# Patient Record
Sex: FEMALE | Race: ASIAN | NOT HISPANIC OR LATINO | URBAN - METROPOLITAN AREA
[De-identification: names, ages, dates, MRNs, and addresses within clinical notes are randomized per-mention and may not be internally consistent; named-entity substitution may affect disease eponyms.]

---

## 2020-05-20 ENCOUNTER — INPATIENT (INPATIENT)
Facility: HOSPITAL | Age: 35
LOS: 1 days | Discharge: HOME | End: 2020-05-22
Attending: OBSTETRICS & GYNECOLOGY | Admitting: OBSTETRICS & GYNECOLOGY
Payer: COMMERCIAL

## 2020-05-20 RX ORDER — CITRIC ACID/SODIUM CITRATE 300-500 MG
15 SOLUTION, ORAL ORAL EVERY 6 HOURS
Refills: 0 | Status: DISCONTINUED | OUTPATIENT
Start: 2020-05-20 | End: 2020-05-21

## 2020-05-20 RX ORDER — OXYTOCIN 10 UNIT/ML
333.33 VIAL (ML) INJECTION
Qty: 20 | Refills: 0 | Status: DISCONTINUED | OUTPATIENT
Start: 2020-05-20 | End: 2020-05-21

## 2020-05-20 RX ORDER — SODIUM CHLORIDE 9 MG/ML
1000 INJECTION, SOLUTION INTRAVENOUS
Refills: 0 | Status: DISCONTINUED | OUTPATIENT
Start: 2020-05-20 | End: 2020-05-21

## 2020-05-20 RX ORDER — OXYTOCIN 10 UNIT/ML
1 VIAL (ML) INJECTION
Qty: 30 | Refills: 0 | Status: DISCONTINUED | OUTPATIENT
Start: 2020-05-20 | End: 2020-05-21

## 2020-05-21 VITALS — HEIGHT: 59 IN | WEIGHT: 139.99 LBS

## 2020-05-21 LAB
ALBUMIN SERPL ELPH-MCNC: 3.8 G/DL — SIGNIFICANT CHANGE UP (ref 3.3–5)
ALP SERPL-CCNC: 101 U/L — SIGNIFICANT CHANGE UP (ref 40–120)
ALT FLD-CCNC: 27 U/L — SIGNIFICANT CHANGE UP (ref 10–45)
ANION GAP SERPL CALC-SCNC: 12 MMOL/L — SIGNIFICANT CHANGE UP (ref 5–17)
APPEARANCE UR: CLEAR — SIGNIFICANT CHANGE UP
APTT BLD: 27.1 SEC — LOW (ref 27.5–36.3)
AST SERPL-CCNC: 30 U/L — SIGNIFICANT CHANGE UP (ref 10–40)
BASOPHILS # BLD AUTO: 0.05 K/UL — SIGNIFICANT CHANGE UP (ref 0–0.2)
BASOPHILS NFR BLD AUTO: 0.4 % — SIGNIFICANT CHANGE UP (ref 0–2)
BILIRUB SERPL-MCNC: <0.2 MG/DL — SIGNIFICANT CHANGE UP (ref 0.2–1.2)
BILIRUB UR-MCNC: NEGATIVE — SIGNIFICANT CHANGE UP
BLD GP AB SCN SERPL QL: NEGATIVE — SIGNIFICANT CHANGE UP
BUN SERPL-MCNC: 22 MG/DL — SIGNIFICANT CHANGE UP (ref 7–23)
CALCIUM SERPL-MCNC: 9.7 MG/DL — SIGNIFICANT CHANGE UP (ref 8.4–10.5)
CHLORIDE SERPL-SCNC: 104 MMOL/L — SIGNIFICANT CHANGE UP (ref 96–108)
CO2 SERPL-SCNC: 20 MMOL/L — LOW (ref 22–31)
COLOR SPEC: YELLOW — SIGNIFICANT CHANGE UP
CREAT ?TM UR-MCNC: 84 MG/DL — SIGNIFICANT CHANGE UP
CREAT SERPL-MCNC: 0.86 MG/DL — SIGNIFICANT CHANGE UP (ref 0.5–1.3)
DIFF PNL FLD: ABNORMAL
EOSINOPHIL # BLD AUTO: 0.08 K/UL — SIGNIFICANT CHANGE UP (ref 0–0.5)
EOSINOPHIL NFR BLD AUTO: 0.7 % — SIGNIFICANT CHANGE UP (ref 0–6)
FIBRINOGEN PPP-MCNC: 486 MG/DL — HIGH (ref 258–438)
GLUCOSE SERPL-MCNC: 86 MG/DL — SIGNIFICANT CHANGE UP (ref 70–99)
GLUCOSE UR QL: NEGATIVE — SIGNIFICANT CHANGE UP
HCT VFR BLD CALC: 28.7 % — LOW (ref 34.5–45)
HCT VFR BLD CALC: 40.8 % — SIGNIFICANT CHANGE UP (ref 34.5–45)
HGB BLD-MCNC: 13.6 G/DL — SIGNIFICANT CHANGE UP (ref 11.5–15.5)
HGB BLD-MCNC: 9.4 G/DL — LOW (ref 11.5–15.5)
IMM GRANULOCYTES NFR BLD AUTO: 0.6 % — SIGNIFICANT CHANGE UP (ref 0–1.5)
INR BLD: 0.83 — LOW (ref 0.88–1.16)
KETONES UR-MCNC: NEGATIVE — SIGNIFICANT CHANGE UP
LDH SERPL L TO P-CCNC: 227 U/L — SIGNIFICANT CHANGE UP (ref 50–242)
LEUKOCYTE ESTERASE UR-ACNC: NEGATIVE — SIGNIFICANT CHANGE UP
LYMPHOCYTES # BLD AUTO: 17.7 % — SIGNIFICANT CHANGE UP (ref 13–44)
LYMPHOCYTES # BLD AUTO: 2.04 K/UL — SIGNIFICANT CHANGE UP (ref 1–3.3)
MCHC RBC-ENTMCNC: 28.6 PG — SIGNIFICANT CHANGE UP (ref 27–34)
MCHC RBC-ENTMCNC: 28.8 PG — SIGNIFICANT CHANGE UP (ref 27–34)
MCHC RBC-ENTMCNC: 32.8 GM/DL — SIGNIFICANT CHANGE UP (ref 32–36)
MCHC RBC-ENTMCNC: 33.3 GM/DL — SIGNIFICANT CHANGE UP (ref 32–36)
MCV RBC AUTO: 86.3 FL — SIGNIFICANT CHANGE UP (ref 80–100)
MCV RBC AUTO: 87.2 FL — SIGNIFICANT CHANGE UP (ref 80–100)
MONOCYTES # BLD AUTO: 0.78 K/UL — SIGNIFICANT CHANGE UP (ref 0–0.9)
MONOCYTES NFR BLD AUTO: 6.8 % — SIGNIFICANT CHANGE UP (ref 2–14)
NEUTROPHILS # BLD AUTO: 8.53 K/UL — HIGH (ref 1.8–7.4)
NEUTROPHILS NFR BLD AUTO: 73.8 % — SIGNIFICANT CHANGE UP (ref 43–77)
NITRITE UR-MCNC: NEGATIVE — SIGNIFICANT CHANGE UP
NRBC # BLD: 0 /100 WBCS — SIGNIFICANT CHANGE UP (ref 0–0)
NRBC # BLD: 0 /100 WBCS — SIGNIFICANT CHANGE UP (ref 0–0)
PH UR: 6.5 — SIGNIFICANT CHANGE UP (ref 5–8)
PLATELET # BLD AUTO: 195 K/UL — SIGNIFICANT CHANGE UP (ref 150–400)
PLATELET # BLD AUTO: 274 K/UL — SIGNIFICANT CHANGE UP (ref 150–400)
POTASSIUM SERPL-MCNC: 3.9 MMOL/L — SIGNIFICANT CHANGE UP (ref 3.5–5.3)
POTASSIUM SERPL-SCNC: 3.9 MMOL/L — SIGNIFICANT CHANGE UP (ref 3.5–5.3)
PROT ?TM UR-MCNC: 8 MG/DL — SIGNIFICANT CHANGE UP (ref 0–12)
PROT SERPL-MCNC: 6.7 G/DL — SIGNIFICANT CHANGE UP (ref 6–8.3)
PROT UR-MCNC: NEGATIVE MG/DL — SIGNIFICANT CHANGE UP
PROT/CREAT UR-RTO: 0.1 RATIO — SIGNIFICANT CHANGE UP (ref 0–0.2)
PROTHROM AB SERPL-ACNC: 9.4 SEC — LOW (ref 10–12.9)
RBC # BLD: 3.29 M/UL — LOW (ref 3.8–5.2)
RBC # BLD: 4.73 M/UL — SIGNIFICANT CHANGE UP (ref 3.8–5.2)
RBC # FLD: 13.9 % — SIGNIFICANT CHANGE UP (ref 10.3–14.5)
RBC # FLD: 14.1 % — SIGNIFICANT CHANGE UP (ref 10.3–14.5)
RH IG SCN BLD-IMP: POSITIVE — SIGNIFICANT CHANGE UP
RH IG SCN BLD-IMP: POSITIVE — SIGNIFICANT CHANGE UP
SODIUM SERPL-SCNC: 136 MMOL/L — SIGNIFICANT CHANGE UP (ref 135–145)
SP GR SPEC: 1.02 — SIGNIFICANT CHANGE UP (ref 1–1.03)
T PALLIDUM AB TITR SER: NEGATIVE — SIGNIFICANT CHANGE UP
URATE SERPL-MCNC: 5.8 MG/DL — SIGNIFICANT CHANGE UP (ref 2.5–7)
UROBILINOGEN FLD QL: 0.2 E.U./DL — SIGNIFICANT CHANGE UP
WBC # BLD: 11.55 K/UL — HIGH (ref 3.8–10.5)
WBC # BLD: 17.48 K/UL — HIGH (ref 3.8–10.5)
WBC # FLD AUTO: 11.55 K/UL — HIGH (ref 3.8–10.5)
WBC # FLD AUTO: 17.48 K/UL — HIGH (ref 3.8–10.5)

## 2020-05-21 PROCEDURE — 88307 TISSUE EXAM BY PATHOLOGIST: CPT | Mod: 26

## 2020-05-21 RX ORDER — TETANUS TOXOID, REDUCED DIPHTHERIA TOXOID AND ACELLULAR PERTUSSIS VACCINE, ADSORBED 5; 2.5; 8; 8; 2.5 [IU]/.5ML; [IU]/.5ML; UG/.5ML; UG/.5ML; UG/.5ML
0.5 SUSPENSION INTRAMUSCULAR ONCE
Refills: 0 | Status: DISCONTINUED | OUTPATIENT
Start: 2020-05-21 | End: 2020-05-22

## 2020-05-21 RX ORDER — DIBUCAINE 1 %
1 OINTMENT (GRAM) RECTAL EVERY 6 HOURS
Refills: 0 | Status: DISCONTINUED | OUTPATIENT
Start: 2020-05-21 | End: 2020-05-22

## 2020-05-21 RX ORDER — NIFEDIPINE 30 MG
30 TABLET, EXTENDED RELEASE 24 HR ORAL EVERY 24 HOURS
Refills: 0 | Status: DISCONTINUED | OUTPATIENT
Start: 2020-05-22 | End: 2020-05-22

## 2020-05-21 RX ORDER — LABETALOL HCL 100 MG
100 TABLET ORAL EVERY 12 HOURS
Refills: 0 | Status: DISCONTINUED | OUTPATIENT
Start: 2020-05-21 | End: 2020-05-22

## 2020-05-21 RX ORDER — HYDROCORTISONE 1 %
1 OINTMENT (GRAM) TOPICAL EVERY 6 HOURS
Refills: 0 | Status: DISCONTINUED | OUTPATIENT
Start: 2020-05-21 | End: 2020-05-22

## 2020-05-21 RX ORDER — OXYCODONE HYDROCHLORIDE 5 MG/1
5 TABLET ORAL ONCE
Refills: 0 | Status: DISCONTINUED | OUTPATIENT
Start: 2020-05-21 | End: 2020-05-22

## 2020-05-21 RX ORDER — OXYCODONE HYDROCHLORIDE 5 MG/1
5 TABLET ORAL
Refills: 0 | Status: DISCONTINUED | OUTPATIENT
Start: 2020-05-21 | End: 2020-05-22

## 2020-05-21 RX ORDER — SODIUM CHLORIDE 9 MG/ML
3 INJECTION INTRAMUSCULAR; INTRAVENOUS; SUBCUTANEOUS EVERY 8 HOURS
Refills: 0 | Status: DISCONTINUED | OUTPATIENT
Start: 2020-05-21 | End: 2020-05-22

## 2020-05-21 RX ORDER — OXYTOCIN 10 UNIT/ML
333.33 VIAL (ML) INJECTION
Qty: 20 | Refills: 0 | Status: DISCONTINUED | OUTPATIENT
Start: 2020-05-21 | End: 2020-05-22

## 2020-05-21 RX ORDER — ACETAMINOPHEN 500 MG
975 TABLET ORAL
Refills: 0 | Status: DISCONTINUED | OUTPATIENT
Start: 2020-05-21 | End: 2020-05-22

## 2020-05-21 RX ORDER — LABETALOL HCL 100 MG
200 TABLET ORAL EVERY 12 HOURS
Refills: 0 | Status: DISCONTINUED | OUTPATIENT
Start: 2020-05-21 | End: 2020-05-21

## 2020-05-21 RX ORDER — AER TRAVELER 0.5 G/1
1 SOLUTION RECTAL; TOPICAL EVERY 4 HOURS
Refills: 0 | Status: DISCONTINUED | OUTPATIENT
Start: 2020-05-21 | End: 2020-05-22

## 2020-05-21 RX ORDER — IBUPROFEN 200 MG
600 TABLET ORAL EVERY 6 HOURS
Refills: 0 | Status: COMPLETED | OUTPATIENT
Start: 2020-05-21 | End: 2021-04-19

## 2020-05-21 RX ORDER — IBUPROFEN 200 MG
600 TABLET ORAL EVERY 6 HOURS
Refills: 0 | Status: DISCONTINUED | OUTPATIENT
Start: 2020-05-21 | End: 2020-05-22

## 2020-05-21 RX ORDER — MAGNESIUM HYDROXIDE 400 MG/1
30 TABLET, CHEWABLE ORAL
Refills: 0 | Status: DISCONTINUED | OUTPATIENT
Start: 2020-05-21 | End: 2020-05-22

## 2020-05-21 RX ORDER — BENZOCAINE 10 %
1 GEL (GRAM) MUCOUS MEMBRANE EVERY 6 HOURS
Refills: 0 | Status: DISCONTINUED | OUTPATIENT
Start: 2020-05-21 | End: 2020-05-22

## 2020-05-21 RX ORDER — LANOLIN
1 OINTMENT (GRAM) TOPICAL EVERY 6 HOURS
Refills: 0 | Status: DISCONTINUED | OUTPATIENT
Start: 2020-05-21 | End: 2020-05-22

## 2020-05-21 RX ORDER — SIMETHICONE 80 MG/1
80 TABLET, CHEWABLE ORAL EVERY 4 HOURS
Refills: 0 | Status: DISCONTINUED | OUTPATIENT
Start: 2020-05-21 | End: 2020-05-22

## 2020-05-21 RX ORDER — PRAMOXINE HYDROCHLORIDE 150 MG/15G
1 AEROSOL, FOAM RECTAL EVERY 4 HOURS
Refills: 0 | Status: DISCONTINUED | OUTPATIENT
Start: 2020-05-21 | End: 2020-05-22

## 2020-05-21 RX ORDER — AMPICILLIN TRIHYDRATE 250 MG
1 CAPSULE ORAL EVERY 4 HOURS
Refills: 0 | Status: DISCONTINUED | OUTPATIENT
Start: 2020-05-21 | End: 2020-05-21

## 2020-05-21 RX ORDER — FENTANYL/BUPIVACAINE/NS/PF 2MCG/ML-.1
250 PLASTIC BAG, INJECTION (ML) INJECTION
Refills: 0 | Status: DISCONTINUED | OUTPATIENT
Start: 2020-05-21 | End: 2020-05-21

## 2020-05-21 RX ORDER — AMPICILLIN TRIHYDRATE 250 MG
2 CAPSULE ORAL ONCE
Refills: 0 | Status: COMPLETED | OUTPATIENT
Start: 2020-05-21 | End: 2020-05-21

## 2020-05-21 RX ORDER — LABETALOL HCL 100 MG
100 TABLET ORAL EVERY 12 HOURS
Refills: 0 | Status: DISCONTINUED | OUTPATIENT
Start: 2020-05-21 | End: 2020-05-21

## 2020-05-21 RX ORDER — KETOROLAC TROMETHAMINE 30 MG/ML
30 SYRINGE (ML) INJECTION ONCE
Refills: 0 | Status: DISCONTINUED | OUTPATIENT
Start: 2020-05-21 | End: 2020-05-21

## 2020-05-21 RX ORDER — CARBOPROST TROMETHAMINE 250 UG/ML
250 INJECTION, SOLUTION INTRAMUSCULAR ONCE
Refills: 0 | Status: COMPLETED | OUTPATIENT
Start: 2020-05-21 | End: 2020-05-21

## 2020-05-21 RX ORDER — NIFEDIPINE 30 MG
60 TABLET, EXTENDED RELEASE 24 HR ORAL DAILY
Refills: 0 | Status: DISCONTINUED | OUTPATIENT
Start: 2020-05-21 | End: 2020-05-21

## 2020-05-21 RX ORDER — DIPHENHYDRAMINE HCL 50 MG
25 CAPSULE ORAL EVERY 6 HOURS
Refills: 0 | Status: DISCONTINUED | OUTPATIENT
Start: 2020-05-21 | End: 2020-05-22

## 2020-05-21 RX ADMIN — SODIUM CHLORIDE 125 MILLILITER(S): 9 INJECTION, SOLUTION INTRAVENOUS at 06:33

## 2020-05-21 RX ADMIN — Medication 1000 MILLIUNIT(S)/MIN: at 20:00

## 2020-05-21 RX ADMIN — Medication 60 MILLIGRAM(S): at 06:31

## 2020-05-21 RX ADMIN — SODIUM CHLORIDE 125 MILLILITER(S): 9 INJECTION, SOLUTION INTRAVENOUS at 09:10

## 2020-05-21 RX ADMIN — Medication 30 MILLIGRAM(S): at 17:52

## 2020-05-21 RX ADMIN — Medication 108 GRAM(S): at 09:09

## 2020-05-21 RX ADMIN — CARBOPROST TROMETHAMINE 250 MICROGRAM(S): 250 INJECTION, SOLUTION INTRAMUSCULAR at 21:35

## 2020-05-21 RX ADMIN — SODIUM CHLORIDE 125 MILLILITER(S): 9 INJECTION, SOLUTION INTRAVENOUS at 02:04

## 2020-05-21 RX ADMIN — Medication 1 MILLIUNIT(S)/MIN: at 02:05

## 2020-05-21 RX ADMIN — Medication 100 MILLIGRAM(S): at 18:24

## 2020-05-21 RX ADMIN — Medication 100 MILLIGRAM(S): at 06:31

## 2020-05-21 RX ADMIN — SODIUM CHLORIDE 3 MILLILITER(S): 9 INJECTION INTRAMUSCULAR; INTRAVENOUS; SUBCUTANEOUS at 22:19

## 2020-05-21 RX ADMIN — Medication 108 GRAM(S): at 13:33

## 2020-05-21 RX ADMIN — Medication 975 MILLIGRAM(S): at 21:29

## 2020-05-21 RX ADMIN — Medication 108 GRAM(S): at 05:30

## 2020-05-21 RX ADMIN — Medication 216 GRAM(S): at 01:15

## 2020-05-21 NOTE — CHART NOTE - NSCHARTNOTEFT_GEN_A_CORE
Pt evaluated at bedside for nurse's concern of bleeding. Nurse reported trickling of blood more than used to seeing. She states uterus is firm and urine output adequate, although blood-tinged. Upon evaluation, pt well-appearing, comfortable, and in NAD. Pt reports feeling well and was in the middle of eating dinner. Therefore, I will return to perform a physical examination shortly. Of note, vital signs stable (/80, HR 78, RR 18, 98% on RA).

## 2020-05-21 NOTE — CHART NOTE - NSCHARTNOTEFT_GEN_A_CORE
Resident called to bedside by PA for active vaginal bleeding. Patient lying comfortable in bed, VSS. She is feeling overall well. Denies light headedness, dizziness, SOB, palpitations or difficulty ambulating. She has been ambulating on her own and  voiding spontaneously.  Fundus firm with passage of clots on fundal massage.  No vaginal lacerations noted. Lower uterine segment atony noted on bimanual exam with passage of clots. Patient on second bag of pitocin running, Chief resident called to bedside.  Hemabate 250mcg x1 given, and 1000mg cytotec IA. Bimanual exam x3 with continued expression of blood clots for a total EBL of 400cc. Starting hemaglobin of 13.6. Stat CBC ordered. Lower uterine atony resolved after hemabate. Hemstasis excellent. Will continue to monitor bleeding and f/u CBC. VSS throughout.     Vital Signs Last 24 Hrs  T(C): 36.6 (21 May 2020 19:00), Max: 37.1 (21 May 2020 17:00)  T(F): 97.9 (21 May 2020 19:00), Max: 98.8 (21 May 2020 17:00)  HR: 77 (21 May 2020 19:00) (77 - 100)  BP: 123/80 (21 May 2020 19:00) (123/80 - 146/90)  BP(mean): --  RR: 18 (21 May 2020 19:00) (17 - 20)  SpO2: 98% (21 May 2020 19:00) (98% - 99%)

## 2020-05-21 NOTE — PROVIDER CONTACT NOTE (OTHER) - ASSESSMENT
Fundus firm. vital stable. Trickle of bleeding on fundal assessment. Patient assisted x2 to the bathroom to void. Voided 100 and bled about 100ml.

## 2020-05-22 VITALS
DIASTOLIC BLOOD PRESSURE: 85 MMHG | RESPIRATION RATE: 14 BRPM | HEART RATE: 89 BPM | SYSTOLIC BLOOD PRESSURE: 124 MMHG | OXYGEN SATURATION: 98 % | TEMPERATURE: 98 F

## 2020-05-22 PROCEDURE — 84156 ASSAY OF PROTEIN URINE: CPT

## 2020-05-22 PROCEDURE — 88307 TISSUE EXAM BY PATHOLOGIST: CPT

## 2020-05-22 PROCEDURE — 83615 LACTATE (LD) (LDH) ENZYME: CPT

## 2020-05-22 PROCEDURE — 85384 FIBRINOGEN ACTIVITY: CPT

## 2020-05-22 PROCEDURE — 85610 PROTHROMBIN TIME: CPT

## 2020-05-22 PROCEDURE — 81001 URINALYSIS AUTO W/SCOPE: CPT

## 2020-05-22 PROCEDURE — 82570 ASSAY OF URINE CREATININE: CPT

## 2020-05-22 PROCEDURE — 85730 THROMBOPLASTIN TIME PARTIAL: CPT

## 2020-05-22 PROCEDURE — 86901 BLOOD TYPING SEROLOGIC RH(D): CPT

## 2020-05-22 PROCEDURE — 80053 COMPREHEN METABOLIC PANEL: CPT

## 2020-05-22 PROCEDURE — 85027 COMPLETE CBC AUTOMATED: CPT

## 2020-05-22 PROCEDURE — 84550 ASSAY OF BLOOD/URIC ACID: CPT

## 2020-05-22 PROCEDURE — 86850 RBC ANTIBODY SCREEN: CPT

## 2020-05-22 PROCEDURE — 86780 TREPONEMA PALLIDUM: CPT

## 2020-05-22 PROCEDURE — 85025 COMPLETE CBC W/AUTO DIFF WBC: CPT

## 2020-05-22 PROCEDURE — 36415 COLL VENOUS BLD VENIPUNCTURE: CPT

## 2020-05-22 RX ORDER — NIFEDIPINE 30 MG
1 TABLET, EXTENDED RELEASE 24 HR ORAL
Qty: 0 | Refills: 0 | DISCHARGE

## 2020-05-22 RX ORDER — ASPIRIN/CALCIUM CARB/MAGNESIUM 324 MG
1 TABLET ORAL
Qty: 0 | Refills: 0 | DISCHARGE

## 2020-05-22 RX ORDER — LABETALOL HCL 100 MG
1 TABLET ORAL
Qty: 0 | Refills: 0 | DISCHARGE

## 2020-05-22 RX ADMIN — Medication 600 MILLIGRAM(S): at 12:16

## 2020-05-22 RX ADMIN — Medication 100 MILLIGRAM(S): at 06:49

## 2020-05-22 RX ADMIN — SODIUM CHLORIDE 3 MILLILITER(S): 9 INJECTION INTRAMUSCULAR; INTRAVENOUS; SUBCUTANEOUS at 06:49

## 2020-05-22 RX ADMIN — Medication 975 MILLIGRAM(S): at 03:04

## 2020-05-22 RX ADMIN — SODIUM CHLORIDE 3 MILLILITER(S): 9 INJECTION INTRAMUSCULAR; INTRAVENOUS; SUBCUTANEOUS at 14:22

## 2020-05-22 RX ADMIN — Medication 30 MILLIGRAM(S): at 09:54

## 2020-05-22 RX ADMIN — Medication 600 MILLIGRAM(S): at 06:49

## 2020-05-22 RX ADMIN — Medication 975 MILLIGRAM(S): at 14:45

## 2020-05-22 RX ADMIN — Medication 600 MILLIGRAM(S): at 17:44

## 2020-05-22 NOTE — DISCHARGE NOTE OB - HOSPITAL COURSE
Patient had an uncomplicated vaginal delivery. Blood pressure controlled with labetalol 100mg twice daily and nifedipine 60mg daily.

## 2020-05-22 NOTE — DISCHARGE NOTE OB - PATIENT PORTAL LINK FT
You can access the FollowMyHealth Patient Portal offered by [facility] by registering at the following website: [email or web address]. By joining [employer]’s FollowMyHealth portal, you will also be able to view your health information using other applications (apps) compatible with our system.

## 2020-05-22 NOTE — DISCHARGE NOTE OB - PLAN OF CARE
healthy postpartum state Take Motrin 600mg every 6 hours and/or tylenol 650mg every 6 hours as needed for pain. Call your OBGYN to schedule a follow up appointment in 4-6weeks. Call your OBGYN if you experiences severe abdominal pain not improved by oral pain medications, heavy bright red vaginal bleeding saturating more than 1 pad per hour, or fever greater than 100.4F. normal range BP Follow up with your OBGYN in one week for a BP check. Schedule a follow up  appointment for 2 weeks. Check bp 3x a day. If blood pressure is greater  >160/110, you develop a headache not relieved by tylenol, visual disturbances, or right upper abdominal pain, call your doctor or go to your nearest emergency room.  Continue with labetalol 100mg twice daily and nifedipine 60mg daily.

## 2020-05-22 NOTE — PROGRESS NOTE ADULT - ATTENDING COMMENTS
Spoke with patient, patient made aware of recent negative urine culture results  Patient to follow up as already scheduled  Patient knows to call office if symptoms return  Patient seen and examined, agree with assessment and plan.  PPD1 s/p  c/b PPH after transfer to PP unit, now doing well.  Also with chronic HTN, BPs appropriate, will monitor.    Dr. Mcconnell, Attending

## 2020-05-22 NOTE — PROGRESS NOTE ADULT - SUBJECTIVE AND OBJECTIVE BOX
Patient evaluated at bedside this morning, resting comfortable in bed. Overnight patient received hemabate and cytotec with bimanual massage for heavy bleeding due to lower uterine segement atony. Bleeding has resolved. She is ambulating to the bathroom and voiding spontaneously.   She reports pain is well controlled with tylenol and motrin  She denies headache, dizziness, chest pain, palpitations, shortness of breath, nausea, vomiting, heavy vaginal bleeding or perineal discomfort. Reports decrease in amount of vaginal bleeding and denies clots.  She is planning on breastfeeding.   Tolerating food well, without nausea/vomit.  Passing flatus.     Physical Exam:  T(C): 36.9 (05-22-20 @ 02:00), Max: 36.9 (05-22-20 @ 02:00)  HR: 69 (05-22-20 @ 02:00) (69 - 93)  BP: 115/74 (05-22-20 @ 02:00) (115/74 - 142/79)  RR: 16 (05-22-20 @ 02:00) (16 - 18)  SpO2: 97% (05-22-20 @ 02:00) (97% - 99%)    GA: NAD, A&O x 3  CV: RRR, no murmurs, rubs, or gallops  Pulm: clear breath sounds throughout, no rales, rhonchi, wheezes  Abd: + BS, soft, nontender, nondistended, no rebound or guarding, uterus firm at midline and 1 fb below umbilicus  Perineum: normal lochia, intact, healing well, no hematoma  Extremities: no swelling or calf tenderness                          9.4    17.48 )-----------( 195      ( 21 May 2020 21:50 )             28.7     05-21    136  |  104  |  22  ----------------------------<  86  3.9   |  20<L>  |  0.86    Ca    9.7      21 May 2020 00:33    TPro  6.7  /  Alb  3.8  /  TBili  <0.2  /  DBili  x   /  AST  30  /  ALT  27  /  AlkPhos  101  05-21    acetaminophen   Tablet .. 975 milliGRAM(s) Oral <User Schedule>  benzocaine 20%/menthol 0.5% Spray 1 Spray(s) Topical every 6 hours PRN  dibucaine 1% Ointment 1 Application(s) Topical every 6 hours PRN  diphenhydrAMINE 25 milliGRAM(s) Oral every 6 hours PRN  diphtheria/tetanus/pertussis (acellular) Vaccine (ADAcel) 0.5 milliLiter(s) IntraMuscular once  hydrocortisone 1% Cream 1 Application(s) Topical every 6 hours PRN  ibuprofen  Tablet. 600 milliGRAM(s) Oral every 6 hours  labetalol 100 milliGRAM(s) Oral every 12 hours  lanolin Ointment 1 Application(s) Topical every 6 hours PRN  magnesium hydroxide Suspension 30 milliLiter(s) Oral two times a day PRN  NIFEdipine XL 30 milliGRAM(s) Oral every 24 hours  oxyCODONE    IR 5 milliGRAM(s) Oral every 3 hours PRN  oxyCODONE    IR 5 milliGRAM(s) Oral once PRN  oxytocin Infusion 333.333 milliUNIT(s)/Min IV Continuous <Continuous>  pramoxine 1%/zinc 5% Cream 1 Application(s) Topical every 4 hours PRN  prenatal multivitamin 1 Tablet(s) Oral daily  simethicone 80 milliGRAM(s) Chew every 4 hours PRN  sodium chloride 0.9% lock flush 3 milliLiter(s) IV Push every 8 hours  witch hazel Pads 1 Application(s) Topical every 4 hours PRN Patient evaluated at bedside this morning, resting comfortable in bed. Overnight patient received hemabate and cytotec with bimanual massage for heavy bleeding (400cc) due to lower uterine segement atony. Bleeding has resolved. She is ambulating to the bathroom and voiding spontaneously.   She reports pain is well controlled with tylenol and motrin  She denies headache, dizziness, chest pain, palpitations, shortness of breath, nausea, vomiting, heavy vaginal bleeding or perineal discomfort. Reports decrease in amount of vaginal bleeding and denies clots.  She is planning on breastfeeding.   Tolerating food well, without nausea/vomit.  Passing flatus.     Physical Exam:  T(C): 36.9 (05-22-20 @ 02:00), Max: 36.9 (05-22-20 @ 02:00)  HR: 69 (05-22-20 @ 02:00) (69 - 93)  BP: 115/74 (05-22-20 @ 02:00) (115/74 - 142/79)  RR: 16 (05-22-20 @ 02:00) (16 - 18)  SpO2: 97% (05-22-20 @ 02:00) (97% - 99%)    GA: NAD, A&O x 3  CV: RRR, no murmurs, rubs, or gallops  Pulm: clear breath sounds throughout, no rales, rhonchi, wheezes  Abd: + BS, soft, nontender, nondistended, no rebound or guarding, uterus firm at midline and 1 fb below umbilicus  Perineum: normal lochia, intact, healing well, no hematoma  Extremities: no swelling or calf tenderness                          9.4    17.48 )-----------( 195      ( 21 May 2020 21:50 )             28.7     05-21    136  |  104  |  22  ----------------------------<  86  3.9   |  20<L>  |  0.86    Ca    9.7      21 May 2020 00:33    TPro  6.7  /  Alb  3.8  /  TBili  <0.2  /  DBili  x   /  AST  30  /  ALT  27  /  AlkPhos  101  05-21    acetaminophen   Tablet .. 975 milliGRAM(s) Oral <User Schedule>  benzocaine 20%/menthol 0.5% Spray 1 Spray(s) Topical every 6 hours PRN  dibucaine 1% Ointment 1 Application(s) Topical every 6 hours PRN  diphenhydrAMINE 25 milliGRAM(s) Oral every 6 hours PRN  diphtheria/tetanus/pertussis (acellular) Vaccine (ADAcel) 0.5 milliLiter(s) IntraMuscular once  hydrocortisone 1% Cream 1 Application(s) Topical every 6 hours PRN  ibuprofen  Tablet. 600 milliGRAM(s) Oral every 6 hours  labetalol 100 milliGRAM(s) Oral every 12 hours  lanolin Ointment 1 Application(s) Topical every 6 hours PRN  magnesium hydroxide Suspension 30 milliLiter(s) Oral two times a day PRN  NIFEdipine XL 30 milliGRAM(s) Oral every 24 hours  oxyCODONE    IR 5 milliGRAM(s) Oral every 3 hours PRN  oxyCODONE    IR 5 milliGRAM(s) Oral once PRN  oxytocin Infusion 333.333 milliUNIT(s)/Min IV Continuous <Continuous>  pramoxine 1%/zinc 5% Cream 1 Application(s) Topical every 4 hours PRN  prenatal multivitamin 1 Tablet(s) Oral daily  simethicone 80 milliGRAM(s) Chew every 4 hours PRN  sodium chloride 0.9% lock flush 3 milliLiter(s) IV Push every 8 hours  witch hazel Pads 1 Application(s) Topical every 4 hours PRN

## 2020-05-22 NOTE — PROGRESS NOTE ADULT - ASSESSMENT
A/P 35y s/p , PPD # 1 , stable, meeting postpartum milestones   - PPH: s/p hemabate x1, cytotec 1000mg MO, lochia now within normal limits. VSS.   - Pain: well controlled on tylenol and motrin  - GI: Tolerating regular diet, colace PRN  - : urinating without difficulty/pain  -DVT prophylaxis: ambulating frequently  -Dispo: PPD 2, unless otherwise specified

## 2020-05-22 NOTE — DISCHARGE NOTE OB - CARE PLAN
Principal Discharge DX:	Postpartum state  Goal:	healthy postpartum state  Assessment and plan of treatment:	Take Motrin 600mg every 6 hours and/or tylenol 650mg every 6 hours as needed for pain. Call your OBGYN to schedule a follow up appointment in 4-6weeks. Call your OBGYN if you experiences severe abdominal pain not improved by oral pain medications, heavy bright red vaginal bleeding saturating more than 1 pad per hour, or fever greater than 100.4F.  Secondary Diagnosis:	Chronic hypertension  Goal:	normal range BP  Assessment and plan of treatment:	Follow up with your OBGYN in one week for a BP check. Schedule a follow up  appointment for 2 weeks. Check bp 3x a day. If blood pressure is greater  >160/110, you develop a headache not relieved by tylenol, visual disturbances, or right upper abdominal pain, call your doctor or go to your nearest emergency room.  Continue with labetalol 100mg twice daily and nifedipine 60mg daily.

## 2020-05-22 NOTE — LACTATION INITIAL EVALUATION - NS LACT CON REASON FOR REQ
primaparous mom/Pt.is a P1 at 38.3 wks. gestation via vaginal delivery.  Pt. reports chronic hypertension and uses labetalol, and nifedipine.  Baby is  25 hrs old has had 1 void,4 stools, weight loss 1.35%.  Baby is asleep.  Pt. preparing for discharge.  Pt. reports baby has been able to latch and is able to take nipple and areola.  Upon observation not damage to nipples noted, colostrum easily noted upon hand expression.  Reviewed with pt. to observe for early feeding cues, encourage skin to skin when breastfeeding, proper alignment of baby's nose to pt.'s nipple,  breastfeed 8-12 x/24 hrs., monitor voids and stools.  Encouraged pt. to exclusively breastfeed 4-6 wks. before introducing pumping.  If pumping now pt. may need to supplement baby with formula if not collecting sufficient amount of colostrum to allow FOB to feed baby so pt. can sleep. Pt. is a P1 at 38.3 wks. gestation via vaginal delivery.  Pt. reports chronic hypertension and uses labetalol, and nifedipine.  Baby is  25 hrs old has had 1 void,4 stools, weight loss 1.35%.  Baby is asleep.  Pt. preparing for discharge.  Pt. reports baby has been able to latch and is able to take nipple and areola.  Upon observation nipples are not bruised or damaged, colostrum easily noted upon hand expression.  Reviewed with pt. to observe for early feeding cues, encourage skin to skin when breastfeeding, proper alignment of baby's nose to pt.'s nipple,  breastfeed 8-12 x/24 hrs., monitor voids and stools.  Encouraged pt. to exclusively breastfeed 4-6 wks. before introducing pumping.  If pumping now pt. may need to supplement baby with formula if not collecting sufficient amount of colostrum to allow FOB to feed baby so pt. can sleep./primaparous mom

## 2020-05-22 NOTE — DISCHARGE NOTE OB - CARE PROVIDER_API CALL
Isela Hood  Obstetrics and Gynecology  [address]  [place] [address]  Phone: [phone]  Fax: [phone]  Follow Up Time:

## 2020-05-22 NOTE — LACTATION INITIAL EVALUATION - LACTATION INTERVENTIONS
[FreeTextEntry1] : cc f/u turbt \par cath removed last visit and voided well\par but had episode of retention goins replaced\par \par path high grade invading lamina propria \par no muscle in specimen  reviewed with pt. to observe for early feeding cues, proper alignment of baby 's nose to pt.'s nipple to encourage a deep latch, breastfeed 8-12 x/24 hrs., monitor voids and stools.

## 2020-05-26 LAB — SURGICAL PATHOLOGY STUDY: SIGNIFICANT CHANGE UP

## 2020-05-27 DIAGNOSIS — Z3A.38 38 WEEKS GESTATION OF PREGNANCY: ICD-10-CM

## 2020-05-27 DIAGNOSIS — Z34.03 ENCOUNTER FOR SUPERVISION OF NORMAL FIRST PREGNANCY, THIRD TRIMESTER: ICD-10-CM

## 2022-05-06 ENCOUNTER — INPATIENT (INPATIENT)
Facility: HOSPITAL | Age: 37
LOS: 0 days | Discharge: HOME | End: 2022-05-07
Attending: OBSTETRICS & GYNECOLOGY | Admitting: OBSTETRICS & GYNECOLOGY
Payer: COMMERCIAL

## 2022-05-06 VITALS — WEIGHT: 149.91 LBS | HEIGHT: 59 IN

## 2022-05-06 LAB
ALBUMIN SERPL ELPH-MCNC: 3.4 G/DL — SIGNIFICANT CHANGE UP (ref 3.3–5)
ALP SERPL-CCNC: 94 U/L — SIGNIFICANT CHANGE UP (ref 40–120)
ALT FLD-CCNC: 21 U/L — SIGNIFICANT CHANGE UP (ref 10–45)
ANION GAP SERPL CALC-SCNC: 9 MMOL/L — SIGNIFICANT CHANGE UP (ref 5–17)
APTT BLD: 27.2 SEC — LOW (ref 27.5–35.5)
AST SERPL-CCNC: 25 U/L — SIGNIFICANT CHANGE UP (ref 10–40)
BASOPHILS # BLD AUTO: 0.05 K/UL — SIGNIFICANT CHANGE UP (ref 0–0.2)
BASOPHILS NFR BLD AUTO: 0.5 % — SIGNIFICANT CHANGE UP (ref 0–2)
BILIRUB SERPL-MCNC: 0.2 MG/DL — SIGNIFICANT CHANGE UP (ref 0.2–1.2)
BLD GP AB SCN SERPL QL: NEGATIVE — SIGNIFICANT CHANGE UP
BUN SERPL-MCNC: 15 MG/DL — SIGNIFICANT CHANGE UP (ref 7–23)
CALCIUM SERPL-MCNC: 8.9 MG/DL — SIGNIFICANT CHANGE UP (ref 8.4–10.5)
CHLORIDE SERPL-SCNC: 105 MMOL/L — SIGNIFICANT CHANGE UP (ref 96–108)
CO2 SERPL-SCNC: 20 MMOL/L — LOW (ref 22–31)
COVID-19 SPIKE DOMAIN AB INTERP: POSITIVE
COVID-19 SPIKE DOMAIN ANTIBODY RESULT: >250 U/ML — HIGH
CREAT ?TM UR-MCNC: 128 MG/DL — SIGNIFICANT CHANGE UP
CREAT SERPL-MCNC: 0.61 MG/DL — SIGNIFICANT CHANGE UP (ref 0.5–1.3)
EGFR: 117 ML/MIN/1.73M2 — SIGNIFICANT CHANGE UP
EOSINOPHIL # BLD AUTO: 0.05 K/UL — SIGNIFICANT CHANGE UP (ref 0–0.5)
EOSINOPHIL NFR BLD AUTO: 0.5 % — SIGNIFICANT CHANGE UP (ref 0–6)
FIBRINOGEN PPP-MCNC: 459 MG/DL — HIGH (ref 258–438)
GLUCOSE SERPL-MCNC: 94 MG/DL — SIGNIFICANT CHANGE UP (ref 70–99)
HCT VFR BLD CALC: 36.1 % — SIGNIFICANT CHANGE UP (ref 34.5–45)
HGB BLD-MCNC: 11.9 G/DL — SIGNIFICANT CHANGE UP (ref 11.5–15.5)
IMM GRANULOCYTES NFR BLD AUTO: 0.5 % — SIGNIFICANT CHANGE UP (ref 0–1.5)
INR BLD: 0.84 — LOW (ref 0.88–1.16)
LDH SERPL L TO P-CCNC: 187 U/L — SIGNIFICANT CHANGE UP (ref 50–242)
LYMPHOCYTES # BLD AUTO: 1.53 K/UL — SIGNIFICANT CHANGE UP (ref 1–3.3)
LYMPHOCYTES # BLD AUTO: 15.2 % — SIGNIFICANT CHANGE UP (ref 13–44)
MCHC RBC-ENTMCNC: 28.2 PG — SIGNIFICANT CHANGE UP (ref 27–34)
MCHC RBC-ENTMCNC: 33 GM/DL — SIGNIFICANT CHANGE UP (ref 32–36)
MCV RBC AUTO: 85.5 FL — SIGNIFICANT CHANGE UP (ref 80–100)
MONOCYTES # BLD AUTO: 0.6 K/UL — SIGNIFICANT CHANGE UP (ref 0–0.9)
MONOCYTES NFR BLD AUTO: 6 % — SIGNIFICANT CHANGE UP (ref 2–14)
NEUTROPHILS # BLD AUTO: 7.76 K/UL — HIGH (ref 1.8–7.4)
NEUTROPHILS NFR BLD AUTO: 77.3 % — HIGH (ref 43–77)
NRBC # BLD: 0 /100 WBCS — SIGNIFICANT CHANGE UP (ref 0–0)
PLATELET # BLD AUTO: 270 K/UL — SIGNIFICANT CHANGE UP (ref 150–400)
POTASSIUM SERPL-MCNC: 3.7 MMOL/L — SIGNIFICANT CHANGE UP (ref 3.5–5.3)
POTASSIUM SERPL-SCNC: 3.7 MMOL/L — SIGNIFICANT CHANGE UP (ref 3.5–5.3)
PROT ?TM UR-MCNC: 12 MG/DL — SIGNIFICANT CHANGE UP (ref 0–12)
PROT SERPL-MCNC: 6.3 G/DL — SIGNIFICANT CHANGE UP (ref 6–8.3)
PROT/CREAT UR-RTO: 0.1 RATIO — SIGNIFICANT CHANGE UP (ref 0–0.2)
PROTHROM AB SERPL-ACNC: 10 SEC — LOW (ref 10.5–13.4)
RBC # BLD: 4.22 M/UL — SIGNIFICANT CHANGE UP (ref 3.8–5.2)
RBC # FLD: 14 % — SIGNIFICANT CHANGE UP (ref 10.3–14.5)
RH IG SCN BLD-IMP: POSITIVE — SIGNIFICANT CHANGE UP
SARS-COV-2 IGG+IGM SERPL QL IA: >250 U/ML — HIGH
SARS-COV-2 IGG+IGM SERPL QL IA: POSITIVE
SODIUM SERPL-SCNC: 134 MMOL/L — LOW (ref 135–145)
T PALLIDUM AB TITR SER: NEGATIVE — SIGNIFICANT CHANGE UP
URATE SERPL-MCNC: 5.9 MG/DL — SIGNIFICANT CHANGE UP (ref 2.5–7)
WBC # BLD: 10.04 K/UL — SIGNIFICANT CHANGE UP (ref 3.8–10.5)
WBC # FLD AUTO: 10.04 K/UL — SIGNIFICANT CHANGE UP (ref 3.8–10.5)

## 2022-05-06 PROCEDURE — 88307 TISSUE EXAM BY PATHOLOGIST: CPT | Mod: 26

## 2022-05-06 RX ORDER — IBUPROFEN 200 MG
600 TABLET ORAL EVERY 6 HOURS
Refills: 0 | Status: COMPLETED | OUTPATIENT
Start: 2022-05-06 | End: 2023-04-04

## 2022-05-06 RX ORDER — OXYTOCIN 10 UNIT/ML
333.33 VIAL (ML) INJECTION
Qty: 20 | Refills: 0 | Status: DISCONTINUED | OUTPATIENT
Start: 2022-05-06 | End: 2022-05-06

## 2022-05-06 RX ORDER — DIBUCAINE 1 %
1 OINTMENT (GRAM) RECTAL EVERY 6 HOURS
Refills: 0 | Status: DISCONTINUED | OUTPATIENT
Start: 2022-05-06 | End: 2022-05-07

## 2022-05-06 RX ORDER — LABETALOL HCL 100 MG
100 TABLET ORAL EVERY 12 HOURS
Refills: 0 | Status: DISCONTINUED | OUTPATIENT
Start: 2022-05-06 | End: 2022-05-07

## 2022-05-06 RX ORDER — AER TRAVELER 0.5 G/1
1 SOLUTION RECTAL; TOPICAL EVERY 4 HOURS
Refills: 0 | Status: DISCONTINUED | OUTPATIENT
Start: 2022-05-06 | End: 2022-05-07

## 2022-05-06 RX ORDER — SODIUM CHLORIDE 9 MG/ML
1000 INJECTION, SOLUTION INTRAVENOUS
Refills: 0 | Status: DISCONTINUED | OUTPATIENT
Start: 2022-05-06 | End: 2022-05-06

## 2022-05-06 RX ORDER — HYDROCORTISONE 1 %
1 OINTMENT (GRAM) TOPICAL EVERY 6 HOURS
Refills: 0 | Status: DISCONTINUED | OUTPATIENT
Start: 2022-05-06 | End: 2022-05-07

## 2022-05-06 RX ORDER — SIMETHICONE 80 MG/1
80 TABLET, CHEWABLE ORAL EVERY 4 HOURS
Refills: 0 | Status: DISCONTINUED | OUTPATIENT
Start: 2022-05-06 | End: 2022-05-07

## 2022-05-06 RX ORDER — TETANUS TOXOID, REDUCED DIPHTHERIA TOXOID AND ACELLULAR PERTUSSIS VACCINE, ADSORBED 5; 2.5; 8; 8; 2.5 [IU]/.5ML; [IU]/.5ML; UG/.5ML; UG/.5ML; UG/.5ML
0.5 SUSPENSION INTRAMUSCULAR ONCE
Refills: 0 | Status: DISCONTINUED | OUTPATIENT
Start: 2022-05-06 | End: 2022-05-07

## 2022-05-06 RX ORDER — PRAMOXINE HYDROCHLORIDE 150 MG/15G
1 AEROSOL, FOAM RECTAL EVERY 4 HOURS
Refills: 0 | Status: DISCONTINUED | OUTPATIENT
Start: 2022-05-06 | End: 2022-05-07

## 2022-05-06 RX ORDER — MAGNESIUM HYDROXIDE 400 MG/1
30 TABLET, CHEWABLE ORAL
Refills: 0 | Status: DISCONTINUED | OUTPATIENT
Start: 2022-05-06 | End: 2022-05-07

## 2022-05-06 RX ORDER — LANOLIN
1 OINTMENT (GRAM) TOPICAL EVERY 6 HOURS
Refills: 0 | Status: DISCONTINUED | OUTPATIENT
Start: 2022-05-06 | End: 2022-05-07

## 2022-05-06 RX ORDER — ACETAMINOPHEN 500 MG
975 TABLET ORAL
Refills: 0 | Status: DISCONTINUED | OUTPATIENT
Start: 2022-05-06 | End: 2022-05-07

## 2022-05-06 RX ORDER — OXYCODONE HYDROCHLORIDE 5 MG/1
5 TABLET ORAL ONCE
Refills: 0 | Status: DISCONTINUED | OUTPATIENT
Start: 2022-05-06 | End: 2022-05-07

## 2022-05-06 RX ORDER — BENZOCAINE 10 %
1 GEL (GRAM) MUCOUS MEMBRANE EVERY 6 HOURS
Refills: 0 | Status: DISCONTINUED | OUTPATIENT
Start: 2022-05-06 | End: 2022-05-07

## 2022-05-06 RX ORDER — AMPICILLIN TRIHYDRATE 250 MG
1 CAPSULE ORAL EVERY 4 HOURS
Refills: 0 | Status: DISCONTINUED | OUTPATIENT
Start: 2022-05-06 | End: 2022-05-06

## 2022-05-06 RX ORDER — OXYTOCIN 10 UNIT/ML
333.33 VIAL (ML) INJECTION
Qty: 20 | Refills: 0 | Status: DISCONTINUED | OUTPATIENT
Start: 2022-05-06 | End: 2022-05-07

## 2022-05-06 RX ORDER — NIFEDIPINE 30 MG
60 TABLET, EXTENDED RELEASE 24 HR ORAL DAILY
Refills: 0 | Status: DISCONTINUED | OUTPATIENT
Start: 2022-05-07 | End: 2022-05-07

## 2022-05-06 RX ORDER — AMPICILLIN TRIHYDRATE 250 MG
2 CAPSULE ORAL ONCE
Refills: 0 | Status: COMPLETED | OUTPATIENT
Start: 2022-05-06 | End: 2022-05-06

## 2022-05-06 RX ORDER — OXYTOCIN 10 UNIT/ML
1 VIAL (ML) INJECTION
Qty: 30 | Refills: 0 | Status: DISCONTINUED | OUTPATIENT
Start: 2022-05-06 | End: 2022-05-07

## 2022-05-06 RX ORDER — DIPHENHYDRAMINE HCL 50 MG
25 CAPSULE ORAL EVERY 6 HOURS
Refills: 0 | Status: DISCONTINUED | OUTPATIENT
Start: 2022-05-06 | End: 2022-05-07

## 2022-05-06 RX ORDER — IBUPROFEN 200 MG
600 TABLET ORAL EVERY 6 HOURS
Refills: 0 | Status: DISCONTINUED | OUTPATIENT
Start: 2022-05-06 | End: 2022-05-07

## 2022-05-06 RX ORDER — FENTANYL/BUPIVACAINE/NS/PF 2MCG/ML-.1
250 PLASTIC BAG, INJECTION (ML) INJECTION
Refills: 0 | Status: DISCONTINUED | OUTPATIENT
Start: 2022-05-06 | End: 2022-05-07

## 2022-05-06 RX ORDER — SODIUM CHLORIDE 9 MG/ML
3 INJECTION INTRAMUSCULAR; INTRAVENOUS; SUBCUTANEOUS EVERY 8 HOURS
Refills: 0 | Status: DISCONTINUED | OUTPATIENT
Start: 2022-05-06 | End: 2022-05-07

## 2022-05-06 RX ORDER — OXYCODONE HYDROCHLORIDE 5 MG/1
5 TABLET ORAL
Refills: 0 | Status: DISCONTINUED | OUTPATIENT
Start: 2022-05-06 | End: 2022-05-07

## 2022-05-06 RX ORDER — KETOROLAC TROMETHAMINE 30 MG/ML
30 SYRINGE (ML) INJECTION ONCE
Refills: 0 | Status: DISCONTINUED | OUTPATIENT
Start: 2022-05-06 | End: 2022-05-06

## 2022-05-06 RX ADMIN — SODIUM CHLORIDE 125 MILLILITER(S): 9 INJECTION, SOLUTION INTRAVENOUS at 12:30

## 2022-05-06 RX ADMIN — Medication 100 MILLIGRAM(S): at 19:47

## 2022-05-06 RX ADMIN — Medication 1000 MILLIUNIT(S)/MIN: at 16:12

## 2022-05-06 RX ADMIN — Medication 108 GRAM(S): at 12:31

## 2022-05-06 RX ADMIN — Medication 216 GRAM(S): at 08:39

## 2022-05-06 RX ADMIN — Medication 1000 MILLIUNIT(S)/MIN: at 16:23

## 2022-05-06 RX ADMIN — Medication 30 MILLIGRAM(S): at 17:00

## 2022-05-06 RX ADMIN — Medication 975 MILLIGRAM(S): at 21:32

## 2022-05-06 RX ADMIN — Medication 1 TABLET(S): at 16:25

## 2022-05-06 RX ADMIN — Medication 975 MILLIGRAM(S): at 21:02

## 2022-05-06 RX ADMIN — SODIUM CHLORIDE 125 MILLILITER(S): 9 INJECTION, SOLUTION INTRAVENOUS at 08:36

## 2022-05-06 RX ADMIN — Medication 30 MILLIGRAM(S): at 16:12

## 2022-05-06 RX ADMIN — Medication 1 MILLIUNIT(S)/MIN: at 09:03

## 2022-05-07 VITALS
OXYGEN SATURATION: 99 % | TEMPERATURE: 98 F | DIASTOLIC BLOOD PRESSURE: 82 MMHG | RESPIRATION RATE: 18 BRPM | SYSTOLIC BLOOD PRESSURE: 133 MMHG | HEART RATE: 80 BPM

## 2022-05-07 PROCEDURE — 80053 COMPREHEN METABOLIC PANEL: CPT

## 2022-05-07 PROCEDURE — 85025 COMPLETE CBC W/AUTO DIFF WBC: CPT

## 2022-05-07 PROCEDURE — 88307 TISSUE EXAM BY PATHOLOGIST: CPT

## 2022-05-07 PROCEDURE — 86850 RBC ANTIBODY SCREEN: CPT

## 2022-05-07 PROCEDURE — 82570 ASSAY OF URINE CREATININE: CPT

## 2022-05-07 PROCEDURE — 85384 FIBRINOGEN ACTIVITY: CPT

## 2022-05-07 PROCEDURE — 85730 THROMBOPLASTIN TIME PARTIAL: CPT

## 2022-05-07 PROCEDURE — 86769 SARS-COV-2 COVID-19 ANTIBODY: CPT

## 2022-05-07 PROCEDURE — 84550 ASSAY OF BLOOD/URIC ACID: CPT

## 2022-05-07 PROCEDURE — 84156 ASSAY OF PROTEIN URINE: CPT

## 2022-05-07 PROCEDURE — 36415 COLL VENOUS BLD VENIPUNCTURE: CPT

## 2022-05-07 PROCEDURE — 86900 BLOOD TYPING SEROLOGIC ABO: CPT

## 2022-05-07 PROCEDURE — 86780 TREPONEMA PALLIDUM: CPT

## 2022-05-07 PROCEDURE — 83615 LACTATE (LD) (LDH) ENZYME: CPT

## 2022-05-07 PROCEDURE — 85610 PROTHROMBIN TIME: CPT

## 2022-05-07 PROCEDURE — 86901 BLOOD TYPING SEROLOGIC RH(D): CPT

## 2022-05-07 RX ORDER — IBUPROFEN 200 MG
1 TABLET ORAL
Qty: 0 | Refills: 0 | DISCHARGE
Start: 2022-05-07

## 2022-05-07 RX ORDER — NIFEDIPINE 30 MG
1 TABLET, EXTENDED RELEASE 24 HR ORAL
Qty: 0 | Refills: 0 | DISCHARGE

## 2022-05-07 RX ORDER — ASPIRIN/CALCIUM CARB/MAGNESIUM 324 MG
0 TABLET ORAL
Qty: 0 | Refills: 0 | DISCHARGE

## 2022-05-07 RX ORDER — ACETAMINOPHEN 500 MG
3 TABLET ORAL
Qty: 0 | Refills: 0 | DISCHARGE
Start: 2022-05-07

## 2022-05-07 RX ORDER — LABETALOL HCL 100 MG
1 TABLET ORAL
Qty: 0 | Refills: 0 | DISCHARGE

## 2022-05-07 RX ADMIN — Medication 975 MILLIGRAM(S): at 03:20

## 2022-05-07 RX ADMIN — Medication 600 MILLIGRAM(S): at 20:05

## 2022-05-07 RX ADMIN — Medication 100 MILLIGRAM(S): at 06:06

## 2022-05-07 RX ADMIN — Medication 600 MILLIGRAM(S): at 13:43

## 2022-05-07 RX ADMIN — Medication 600 MILLIGRAM(S): at 12:43

## 2022-05-07 RX ADMIN — Medication 60 MILLIGRAM(S): at 06:06

## 2022-05-07 RX ADMIN — Medication 100 MILLIGRAM(S): at 19:22

## 2022-05-07 RX ADMIN — SODIUM CHLORIDE 3 MILLILITER(S): 9 INJECTION INTRAMUSCULAR; INTRAVENOUS; SUBCUTANEOUS at 14:00

## 2022-05-07 RX ADMIN — Medication 975 MILLIGRAM(S): at 16:10

## 2022-05-07 RX ADMIN — Medication 975 MILLIGRAM(S): at 10:31

## 2022-05-07 RX ADMIN — Medication 975 MILLIGRAM(S): at 09:31

## 2022-05-07 RX ADMIN — Medication 600 MILLIGRAM(S): at 06:05

## 2022-05-07 RX ADMIN — Medication 1 TABLET(S): at 12:43

## 2022-05-07 RX ADMIN — Medication 975 MILLIGRAM(S): at 02:52

## 2022-05-07 RX ADMIN — Medication 975 MILLIGRAM(S): at 15:10

## 2022-05-07 RX ADMIN — Medication 600 MILLIGRAM(S): at 19:21

## 2022-05-07 RX ADMIN — Medication 600 MILLIGRAM(S): at 06:35

## 2022-05-07 NOTE — PROGRESS NOTE ADULT - SUBJECTIVE AND OBJECTIVE BOX
Patient evaluated at bedside this morning, resting comfortable in bed, no acute events overnight.  She reports pain is well controlled with tylenol and motrin  She denies headache, dizziness, chest pain, palpitations, shortness of breath, nausea, vomiting, heavy vaginal bleeding or perineal discomfort. Reports decrease in amount of vaginal bleeding and denies clots.  She has been ambulating without assistance, voiding spontaneously, and is breastfeeding.   Tolerating food well, without nausea/vomit.  Passing flatus.     Physical Exam:  T(C): 36.9 (05-07-22 @ 01:46), Max: 36.9 (05-07-22 @ 01:46)  HR: 77 (05-07-22 @ 01:46) (77 - 84)  BP: 117/70 (05-07-22 @ 01:46) (113/72 - 120/81)  RR: 17 (05-07-22 @ 01:46) (17 - 18)  SpO2: 97% (05-07-22 @ 01:46) (97% - 99%)    GA: NAD, A&O x 3  Pulm: non distressed breathing  Abd: + BS, soft, nontender, nondistended, no rebound or guarding, uterus firm at midline and 2  fb below umbilicus  Perineum: normal lochia, intact, healing well, no hematoma  Extremities: no calf tenderness  Skin: no rashes                          11.9   10.04 )-----------( 270      ( 06 May 2022 08:33 )             36.1     05-06    134<L>  |  105  |  15  ----------------------------<  94  3.7   |  20<L>  |  0.61    Ca    8.9      06 May 2022 08:52    TPro  6.3  /  Alb  3.4  /  TBili  0.2  /  DBili  x   /  AST  25  /  ALT  21  /  AlkPhos  94  05-06    acetaminophen     Tablet .. 975 milliGRAM(s) Oral <User Schedule>  benzocaine 20%/menthol 0.5% Spray 1 Spray(s) Topical every 6 hours PRN  dibucaine 1% Ointment 1 Application(s) Topical every 6 hours PRN  diphenhydrAMINE 25 milliGRAM(s) Oral every 6 hours PRN  diphtheria/tetanus/pertussis (acellular) Vaccine (ADAcel) 0.5 milliLiter(s) IntraMuscular once  fentanyl (2 MICROgram(s)/mL) + bupivacaine 0.1% in 0.9% Sodium Chloride PCEA 250 milliLiter(s) Epidural PCA Continuous  hydrocortisone 1% Cream 1 Application(s) Topical every 6 hours PRN  ibuprofen  Tablet. 600 milliGRAM(s) Oral every 6 hours  labetalol 100 milliGRAM(s) Oral every 12 hours  lanolin Ointment 1 Application(s) Topical every 6 hours PRN  magnesium hydroxide Suspension 30 milliLiter(s) Oral two times a day PRN  NIFEdipine XL 60 milliGRAM(s) Oral daily  oxyCODONE    IR 5 milliGRAM(s) Oral every 3 hours PRN  oxyCODONE    IR 5 milliGRAM(s) Oral once PRN  oxytocin Infusion 333.333 milliUNIT(s)/Min IV Continuous <Continuous>  oxytocin Infusion. 1 milliUNIT(s)/Min IV Continuous <Continuous>  pramoxine 1%/zinc 5% Cream 1 Application(s) Topical every 4 hours PRN  prenatal multivitamin 1 Tablet(s) Oral daily  simethicone 80 milliGRAM(s) Chew every 4 hours PRN  sodium chloride 0.9% lock flush 3 milliLiter(s) IV Push every 8 hours  witch hazel Pads 1 Application(s) Topical every 4 hours PRN

## 2022-05-07 NOTE — DISCHARGE NOTE OB - NS MD DC FALL RISK RISK
For information on Fall & Injury Prevention, visit: [email or web address] OR  [email or web address] OR  https://www.cdc.gov/steadi/patient.html

## 2022-05-07 NOTE — DISCHARGE NOTE OB - CARE PLAN
1 Principal Discharge DX:	Postpartum state  Assessment and plan of treatment:	safe discharge to home  Secondary Diagnosis:	Chronic hypertension  Assessment and plan of treatment:	Follow up within your OB in one week for a blood pressure check. Check bp 3x a day. If blood pressure greater than 160/110, you develop a headache not relieved by tylenol, visual disturbances, or right upper abdominal pain, call your doctor or the hospital, or go to your nearest emergency room.

## 2022-05-07 NOTE — DISCHARGE NOTE OB - CARE PROVIDER_API CALL
Isela Hood  OBSTETRICS AND GYNECOLOGY  [address]  [place] [address]  Phone: [phone]  Fax: [phone]  Follow Up Time:

## 2022-05-07 NOTE — DISCHARGE NOTE OB - PATIENT PORTAL LINK FT
You can access the FollowMyHealth Patient Portal offered by [facility] by registering at the following website: [email or web address]. By joining [employer]’s FollowMyHealth portal, you will also be able to view your health information using other applications (apps) compatible with our system.

## 2022-05-07 NOTE — DISCHARGE NOTE OB - PLAN OF CARE
safe discharge to home Follow up within your OB in one week for a blood pressure check. Check bp 3x a day. If blood pressure greater than 160/110, you develop a headache not relieved by tylenol, visual disturbances, or right upper abdominal pain, call your doctor or the hospital, or go to your nearest emergency room.

## 2022-05-07 NOTE — LACTATION INITIAL EVALUATION - NS LACT CON REASON FOR REQ
37.6 week baby boy seen at 23 hours of life. Voiding. Stooling. this is mother's second baby. Mother states she  her now 23 months old baby until he was 18 months. Mother planned to bf this baby exclusively. At  arrival, baby was in swaddle sleeping in crib. Mother states baby finsihed last feeding at 11:30.  unswaddled baby and placed baby mother's chest for SSC, baby woke up and showed early signs of being hungry. Mother has bilateral medium br. large everted nipples, skin dry and intact. HE education given with demonstration, + colotrum noted. Mother was able to latch baby deeply with cradle with minimum assist. + rhythmic sustained sucks noted. Mother taught techniques to properly aligned baby and to support breast and baby to facilitate deep latch. Baby has lower lip rolled in at times and mother was shown way to flange out lower lip wider. Mother stated she felt tugging and pulling, denied any pain or discomfort. Baby was fed for 20 minute on L breast and now intentailly detached from breast and fell asleep. Complete bf education given. Frequent/ responsive feeding and SSC encouraged. Mother verbalizes understanding of the above. Plan as above communicated to primary RN./multiparous mom

## 2022-05-07 NOTE — DISCHARGE NOTE OB - MEDICATION SUMMARY - MEDICATIONS TO TAKE
I will START or STAY ON the medications listed below when I get home from the hospital:    acetaminophen 325 mg oral tablet  -- 3 tab(s) by mouth every 6 hours  -- Indication: For pain    ibuprofen 600 mg oral tablet  -- 1 tab(s) by mouth every 6 hours  -- Indication: For pain    labetalol 100 mg oral tablet  -- 1 tab(s) by mouth 2 times a day  -- Indication: For hypertension    NIFEdipine 60 mg oral tablet, extended release  -- 1 tab(s) by mouth once a day  -- Indication: For hypertension    Prenatal Multivitamins oral tablet  -- orally once a day  -- Indication: For postpartum

## 2022-05-07 NOTE — PROGRESS NOTE ADULT - ASSESSMENT
A/P 37y s/p  c/b gHTN, PPD #1  , stable, meeting postpartum milestones   - cHTN: normotensive, denies all toxic s/s of PEC, ctm  - Pain: well controlled on opm  - GI: Tolerating regular diet  - : urinating without difficulty/pain  -DVT prophylaxis: ambulating frequently  -Dispo: PPD 2, unless otherwise specified   A/P 37y s/p  c/b gHTN, PPD #1  , stable, meeting postpartum milestones   - cHTN: normotensive, denies all toxic s/s of PEC, Labetalol 100 BID, Nifedipine 60qd ctm  - Pain: well controlled on opm  - GI: Tolerating regular diet  - : urinating without difficulty/pain  -DVT prophylaxis: ambulating frequently  -Dispo: PPD 2, unless otherwise specified

## 2022-05-12 DIAGNOSIS — Z3A.38 38 WEEKS GESTATION OF PREGNANCY: ICD-10-CM

## 2022-05-12 DIAGNOSIS — Z90.49 ACQUIRED ABSENCE OF OTHER SPECIFIED PARTS OF DIGESTIVE TRACT: ICD-10-CM

## 2022-05-12 DIAGNOSIS — Z87.410 PERSONAL HISTORY OF CERVICAL DYSPLASIA: ICD-10-CM

## 2022-05-19 NOTE — DISCHARGE NOTE OB - CARE PROVIDERS DIRECT ADDRESSES
,DirectAddress_Unknown Oral Minoxidil Counseling- I discussed with the patient the risks of oral minoxidil including but not limited to shortness of breath, swelling of the feet or ankles, dizziness, lightheadedness, unwanted hair growth and allergic reaction.  The patient verbalized understanding of the proper use and possible adverse effects of oral minoxidil.  All of the patient's questions and concerns were addressed.

## 2022-06-17 LAB — SURGICAL PATHOLOGY STUDY: SIGNIFICANT CHANGE UP

## 2022-08-03 NOTE — DISCHARGE NOTE OB - FUNCTIONAL SCREEN CURRENT LEVEL: BATHING, MLM
0 = independent Patient requests all Lab, Cardiology, and Radiology Results on their Discharge Instructions

## 2023-10-25 NOTE — PATIENT PROFILE OB - HIV: DATE, OB PROFILE
29-Oct-2021
X Size Of Lesion In Cm (Optional): 0
Procedure To Be Performed At Next Visit: Cryotherapy
Introduction Text (Please End With A Colon): The following procedure was deferred:
Detail Level: Detailed

## 2024-08-01 NOTE — PATIENT PROFILE OB - FALL HARM RISK - CONCLUSION
Patient is a 58y old  Male who presents with a chief complaint of Hematuria, fatigue (29 Jul 2024 13:03)      SUBJECTIVE / OVERNIGHT EVENTS: No acute events overnight. Pt has no new complaints. Still having hematuria though pt says it looks improved.     ADDITIONAL REVIEW OF SYSTEMS:    MEDICATIONS  (STANDING):  oxybutynin 5 milliGRAM(s) Oral every 8 hours  pantoprazole    Tablet 40 milliGRAM(s) Oral two times a day  sodium bicarbonate 650 milliGRAM(s) Oral two times a day  sodium chloride 1 Gram(s) Oral three times a day    MEDICATIONS  (PRN):  loperamide 2 milliGRAM(s) Oral every 6 hours PRN high ileostomy output      CAPILLARY BLOOD GLUCOSE      POCT Blood Glucose.: 103 mg/dL (29 Jul 2024 12:08)  POCT Blood Glucose.: 114 mg/dL (29 Jul 2024 05:32)    I&O's Summary    28 Jul 2024 07:01  -  29 Jul 2024 07:00  --------------------------------------------------------  IN: 1496 mL / OUT: 4250 mL / NET: -2754 mL    29 Jul 2024 07:01  -  29 Jul 2024 15:42  --------------------------------------------------------  IN: 0 mL / OUT: 850 mL / NET: -850 mL        PHYSICAL EXAM:  Vital Signs Last 24 Hrs  T(C): 36.7 (29 Jul 2024 13:57), Max: 36.9 (29 Jul 2024 05:30)  T(F): 98.1 (29 Jul 2024 13:57), Max: 98.5 (29 Jul 2024 05:30)  HR: 69 (29 Jul 2024 13:57) (65 - 83)  BP: 111/63 (29 Jul 2024 13:57) (102/61 - 111/63)  BP(mean): --  RR: 15 (29 Jul 2024 13:57) (15 - 18)  SpO2: 100% (29 Jul 2024 13:57) (100% - 100%)    Parameters below as of 29 Jul 2024 13:57  Patient On (Oxygen Delivery Method): room air      CONSTITUTIONAL: NAD  EYES: PERRLA; conjunctiva and sclera clear  ENMT: Moist oral mucosa, no pharyngeal injection or exudates; normal dentition  NECK: Supple, no palpable masses; no thyromegaly  RESPIRATORY: Normal respiratory effort; lungs are clear to auscultation bilaterally  CARDIOVASCULAR: Regular rate and rhythm, normal S1 and S2, no murmur/rub/gallop; No lower extremity edema; Peripheral pulses are 2+ bilaterally  ABDOMEN: Nontender to palpation, normoactive bowel sounds, no rebound/guarding; No hepatosplenomegaly  MUSCULOSKELETAL:  Normal gait; no clubbing or cyanosis of digits; no joint swelling or tenderness to palpation  PSYCH: A+O to person, place, and time; affect appropriate  NEUROLOGY: CN 2-12 are intact and symmetric; no gross sensory deficits   SKIN: No rashes; no palpable lesions    LABS:                        8.8    8.88  )-----------( 253      ( 29 Jul 2024 12:45 )             26.7     07-29    130<L>  |  97<L>  |  16  ----------------------------<  96  3.7   |  25  |  1.58<H>    Ca    8.0<L>      29 Jul 2024 06:11    TPro  5.3<L>  /  Alb  2.5<L>  /  TBili  1.1  /  DBili  x   /  AST  17  /  ALT  8   /  AlkPhos  83  07-29    PT/INR - ( 29 Jul 2024 06:11 )   PT: 16.4 sec;   INR: 1.47 ratio         PTT - ( 29 Jul 2024 06:11 )  PTT:27.2 sec      Urinalysis Basic - ( 29 Jul 2024 06:11 )    Color: x / Appearance: x / SG: x / pH: x  Gluc: 96 mg/dL / Ketone: x  / Bili: x / Urobili: x   Blood: x / Protein: x / Nitrite: x   Leuk Esterase: x / RBC: x / WBC x   Sq Epi: x / Non Sq Epi: x / Bacteria: x          RADIOLOGY & ADDITIONAL TESTS:  Results Reviewed:   Imaging Personally Reviewed:  Electrocardiogram Personally Reviewed:    COORDINATION OF CARE:  Care Discussed with Consultants/Other Providers [Y/N]:  Prior or Outpatient Records Reviewed [Y/N]:   Patient is a 58y old  Male who presents with a chief complaint of Hematuria, fatigue (29 Jul 2024 15:41)      SUBJECTIVE / OVERNIGHT EVENTS: No acute events overnight. Pt has no new complaints     ADDITIONAL REVIEW OF SYSTEMS:    MEDICATIONS  (STANDING):  magnesium oxide 400 milliGRAM(s) Oral three times a day with meals  oxybutynin 5 milliGRAM(s) Oral every 8 hours  pantoprazole    Tablet 40 milliGRAM(s) Oral two times a day  sodium bicarbonate 650 milliGRAM(s) Oral two times a day  sodium chloride 1 Gram(s) Oral three times a day    MEDICATIONS  (PRN):  loperamide 2 milliGRAM(s) Oral every 6 hours PRN high ileostomy output      CAPILLARY BLOOD GLUCOSE      POCT Blood Glucose.: 122 mg/dL (30 Jul 2024 12:32)  POCT Blood Glucose.: 99 mg/dL (30 Jul 2024 08:51)  POCT Blood Glucose.: 129 mg/dL (29 Jul 2024 23:25)  POCT Blood Glucose.: 88 mg/dL (29 Jul 2024 17:43)    I&O's Summary    29 Jul 2024 07:01  -  30 Jul 2024 07:00  --------------------------------------------------------  IN: 1020 mL / OUT: 3025 mL / NET: -2005 mL    30 Jul 2024 07:01  -  30 Jul 2024 16:16  --------------------------------------------------------  IN: 0 mL / OUT: 700 mL / NET: -700 mL        PHYSICAL EXAM:  Vital Signs Last 24 Hrs  T(C): 36.8 (30 Jul 2024 09:54), Max: 36.9 (29 Jul 2024 18:00)  T(F): 98.2 (30 Jul 2024 09:54), Max: 98.5 (29 Jul 2024 18:00)  HR: 83 (30 Jul 2024 09:54) (79 - 99)  BP: 102/60 (30 Jul 2024 09:54) (102/60 - 112/67)  BP(mean): --  RR: 17 (30 Jul 2024 09:54) (16 - 17)  SpO2: 100% (30 Jul 2024 09:54) (99% - 100%)    Parameters below as of 30 Jul 2024 09:54  Patient On (Oxygen Delivery Method): room air      CONSTITUTIONAL: NAD  EYES: PERRLA; conjunctiva and sclera clear  ENMT: Moist oral mucosa, no pharyngeal injection or exudates; normal dentition  NECK: Supple, no palpable masses; no thyromegaly  RESPIRATORY: Normal respiratory effort; lungs are clear to auscultation bilaterally  CARDIOVASCULAR: Regular rate and rhythm, normal S1 and S2, no murmur/rub/gallop; No lower extremity edema; Peripheral pulses are 2+ bilaterally  ABDOMEN: Nontender to palpation, normoactive bowel sounds, no rebound/guarding; No hepatosplenomegaly  MUSCULOSKELETAL:  Normal gait; no clubbing or cyanosis of digits; no joint swelling or tenderness to palpation  PSYCH: A+O to person, place, and time; affect appropriate  NEUROLOGY: CN 2-12 are intact and symmetric; no gross sensory deficits   SKIN: No rashes; no palpable lesions    LABS:                        7.9    9.04  )-----------( 207      ( 30 Jul 2024 06:00 )             25.4     07-30    132<L>  |  101  |  15  ----------------------------<  105<H>  4.1   |  24  |  1.52<H>    Ca    7.9<L>      30 Jul 2024 06:00  Phos  3.1     07-30  Mg     1.50     07-30    TPro  5.3<L>  /  Alb  2.5<L>  /  TBili  1.1  /  DBili  x   /  AST  17  /  ALT  8   /  AlkPhos  83  07-29    PT/INR - ( 29 Jul 2024 06:11 )   PT: 16.4 sec;   INR: 1.47 ratio         PTT - ( 29 Jul 2024 06:11 )  PTT:27.2 sec      Urinalysis Basic - ( 30 Jul 2024 06:00 )    Color: x / Appearance: x / SG: x / pH: x  Gluc: 105 mg/dL / Ketone: x  / Bili: x / Urobili: x   Blood: x / Protein: x / Nitrite: x   Leuk Esterase: x / RBC: x / WBC x   Sq Epi: x / Non Sq Epi: x / Bacteria: x          RADIOLOGY & ADDITIONAL TESTS:  Results Reviewed:   Imaging Personally Reviewed:  Electrocardiogram Personally Reviewed:    COORDINATION OF CARE:  Care Discussed with Consultants/Other Providers [Y/N]:  Prior or Outpatient Records Reviewed [Y/N]:   Medicine Progress Note    Patient is a 58y old  Male who presents with a chief complaint of Hematuria, fatigue (26 Jul 2024 13:26)      SUBJECTIVE / OVERNIGHT EVENTS:  no bleeding in ileostomy bag, hematuria     ADDITIONAL REVIEW OF SYSTEMS: negative     MEDICATIONS  (STANDING):  heparin  Infusion 1150 Unit(s)/Hr (11.5 mL/Hr) IV Continuous <Continuous>  oxybutynin 5 milliGRAM(s) Oral every 8 hours  pantoprazole    Tablet 40 milliGRAM(s) Oral two times a day  piperacillin/tazobactam IVPB.. 3.375 Gram(s) IV Intermittent every 8 hours  sodium bicarbonate 650 milliGRAM(s) Oral two times a day  sodium chloride 1 Gram(s) Oral three times a day    MEDICATIONS  (PRN):  loperamide 2 milliGRAM(s) Oral every 6 hours PRN high ileostomy output    CAPILLARY BLOOD GLUCOSE        I&O's Summary    26 Jul 2024 07:01  -  27 Jul 2024 07:00  --------------------------------------------------------  IN: 1870.5 mL / OUT: 2525 mL / NET: -654.5 mL    27 Jul 2024 07:01  -  27 Jul 2024 14:09  --------------------------------------------------------  IN: 292 mL / OUT: 200 mL / NET: 92 mL        PHYSICAL EXAM:  Vital Signs Last 24 Hrs  T(C): 36.8 (27 Jul 2024 10:05), Max: 37.1 (26 Jul 2024 18:10)  T(F): 98.2 (27 Jul 2024 10:05), Max: 98.8 (26 Jul 2024 18:10)  HR: 97 (27 Jul 2024 10:05) (71 - 97)  BP: 122/80 (27 Jul 2024 10:05) (96/62 - 122/80)  BP(mean): --  RR: 16 (27 Jul 2024 10:05) (16 - 18)  SpO2: 100% (27 Jul 2024 10:05) (97% - 100%)    Parameters below as of 27 Jul 2024 10:05  Patient On (Oxygen Delivery Method): room air      CONSTITUTIONAL: NAD,  ENMT: Moist oral mucosa, no pharyngeal injection or exudates;  RESPIRATORY: Normal respiratory effort; lungs are clear to auscultation bilaterally  CARDIOVASCULAR: Regular rate and rhythm, normal S1 and S2, No lower extremity edema;  ABDOMEN: Nontender to palpation, normoactive bowel sounds, ileostomy bag, Lind   PSYCH: A+O to person, place, and time; affect appropriate  NEUROLOGY: CN 2-12 are intact and symmetric; no gross sensory deficits   SKIN: No rashes;    LABS:                        7.1    7.98  )-----------( 209      ( 27 Jul 2024 03:50 )             22.8     07-27    130<L>  |  97<L>  |  18  ----------------------------<  101<H>  3.6   |  24  |  1.58<H>    Ca    8.1<L>      27 Jul 2024 03:50  Phos  3.0     07-26  Mg     1.40     07-26    TPro  5.4<L>  /  Alb  2.5<L>  /  TBili  1.0  /  DBili  x   /  AST  15  /  ALT  6   /  AlkPhos  85  07-27    PTT - ( 27 Jul 2024 11:51 )  PTT:47.0 sec      Urinalysis Basic - ( 27 Jul 2024 03:50 )    Color: x / Appearance: x / SG: x / pH: x  Gluc: 101 mg/dL / Ketone: x  / Bili: x / Urobili: x   Blood: x / Protein: x / Nitrite: x   Leuk Esterase: x / RBC: x / WBC x   Sq Epi: x / Non Sq Epi: x / Bacteria: x        Culture - Urine (collected 25 Jul 2024 17:00)  Source: Clean Catch Clean Catch (Midstream)  Final Report (27 Jul 2024 10:25):    <10,000 CFU/mL Normal Urogenital Heidy    Culture - Blood (collected 25 Jul 2024 16:40)  Source: .Blood Blood-Peripheral  Preliminary Report (26 Jul 2024 22:02):    No growth at 24 hours    Culture - Blood (collected 25 Jul 2024 16:17)  Source: .Blood Blood-Peripheral  Preliminary Report (26 Jul 2024 22:02):    No growth at 24 hours          RADIOLOGY & ADDITIONAL TESTS:  Imaging from Last 24 Hours:    Electrocardiogram/QTc Interval:    COORDINATION OF CARE:  Care Discussed with Consultants/Other Providers: ACP    Patient is a 58y old  Male who presents with a chief complaint of Hematuria, fatigue (31 Jul 2024 15:04)      SUBJECTIVE / OVERNIGHT EVENTS: No acute events overnight. Pt has no new complaints     ADDITIONAL REVIEW OF SYSTEMS:    MEDICATIONS  (STANDING):  pantoprazole    Tablet 40 milliGRAM(s) Oral two times a day  sodium bicarbonate 650 milliGRAM(s) Oral two times a day  sodium chloride 1 Gram(s) Oral three times a day    MEDICATIONS  (PRN):  loperamide 2 milliGRAM(s) Oral every 6 hours PRN high ileostomy output      CAPILLARY BLOOD GLUCOSE      POCT Blood Glucose.: 114 mg/dL (30 Jul 2024 21:47)  POCT Blood Glucose.: 117 mg/dL (30 Jul 2024 17:19)    I&O's Summary    30 Jul 2024 07:01  -  31 Jul 2024 07:00  --------------------------------------------------------  IN: 580 mL / OUT: 2000 mL / NET: -1420 mL    31 Jul 2024 07:01  -  31 Jul 2024 15:34  --------------------------------------------------------  IN: 250 mL / OUT: 352 mL / NET: -102 mL        PHYSICAL EXAM:  Vital Signs Last 24 Hrs  T(C): 36.8 (31 Jul 2024 09:54), Max: 37.2 (30 Jul 2024 18:05)  T(F): 98.3 (31 Jul 2024 09:54), Max: 99 (31 Jul 2024 02:00)  HR: 75 (31 Jul 2024 09:54) (74 - 84)  BP: 117/73 (31 Jul 2024 09:54) (107/69 - 117/73)  BP(mean): --  RR: 18 (31 Jul 2024 09:54) (18 - 19)  SpO2: 100% (31 Jul 2024 09:54) (99% - 100%)    Parameters below as of 31 Jul 2024 09:54  Patient On (Oxygen Delivery Method): room air      CONSTITUTIONAL: NAD  EYES: PERRLA; conjunctiva and sclera clear  ENMT: Moist oral mucosa, no pharyngeal injection or exudates; normal dentition  NECK: Supple, no palpable masses; no thyromegaly  RESPIRATORY: Normal respiratory effort; lungs are clear to auscultation bilaterally  CARDIOVASCULAR: Regular rate and rhythm, normal S1 and S2, no murmur/rub/gallop; No lower extremity edema; Peripheral pulses are 2+ bilaterally  ABDOMEN: Nontender to palpation, normoactive bowel sounds, no rebound/guarding; No hepatosplenomegaly  MUSCULOSKELETAL:  Normal gait; no clubbing or cyanosis of digits; no joint swelling or tenderness to palpation  PSYCH: A+O to person, place, and time; affect appropriate  NEUROLOGY: CN 2-12 are intact and symmetric; no gross sensory deficits   SKIN: No rashes; no palpable lesions    LABS:                        8.4    9.84  )-----------( 197      ( 31 Jul 2024 05:22 )             26.4     07-31    131<L>  |  98  |  14  ----------------------------<  101<H>  4.3   |  22  |  1.47<H>    Ca    8.0<L>      31 Jul 2024 05:22  Phos  2.3     07-31  Mg     1.30     07-31    TPro  5.7<L>  /  Alb  2.5<L>  /  TBili  0.7  /  DBili  x   /  AST  15  /  ALT  8   /  AlkPhos  87  07-31          Urinalysis Basic - ( 31 Jul 2024 05:22 )    Color: x / Appearance: x / SG: x / pH: x  Gluc: 101 mg/dL / Ketone: x  / Bili: x / Urobili: x   Blood: x / Protein: x / Nitrite: x   Leuk Esterase: x / RBC: x / WBC x   Sq Epi: x / Non Sq Epi: x / Bacteria: x          RADIOLOGY & ADDITIONAL TESTS:  Results Reviewed:   Imaging Personally Reviewed:  Electrocardiogram Personally Reviewed:    COORDINATION OF CARE:  Care Discussed with Consultants/Other Providers [Y/N]:  Prior or Outpatient Records Reviewed [Y/N]:   Medicine Progress Note    Patient is a 58y old  Male who presents with a chief complaint of Hematuria, fatigue (28 Jul 2024 15:51)      SUBJECTIVE / OVERNIGHT EVENTS: worsening hematuria, Lind not draining, Urology consulted     ADDITIONAL REVIEW OF SYSTEMS: negative     MEDICATIONS  (STANDING):  oxybutynin 5 milliGRAM(s) Oral every 8 hours  pantoprazole    Tablet 40 milliGRAM(s) Oral two times a day  sodium bicarbonate 650 milliGRAM(s) Oral two times a day  sodium chloride 1 Gram(s) Oral three times a day    MEDICATIONS  (PRN):  loperamide 2 milliGRAM(s) Oral every 6 hours PRN high ileostomy output    CAPILLARY BLOOD GLUCOSE        I&O's Summary    27 Jul 2024 07:01  -  28 Jul 2024 07:00  --------------------------------------------------------  IN: 1322 mL / OUT: 1850 mL / NET: -528 mL    28 Jul 2024 07:01  -  28 Jul 2024 19:28  --------------------------------------------------------  IN: 816 mL / OUT: 1650 mL / NET: -834 mL        PHYSICAL EXAM:  Vital Signs Last 24 Hrs  T(C): 36.3 (28 Jul 2024 18:37), Max: 36.8 (28 Jul 2024 14:33)  T(F): 97.4 (28 Jul 2024 18:37), Max: 98.3 (28 Jul 2024 14:33)  HR: 83 (28 Jul 2024 18:37) (69 - 87)  BP: 105/63 (28 Jul 2024 18:37) (100/64 - 116/81)  BP(mean): --  RR: 16 (28 Jul 2024 18:37) (16 - 18)  SpO2: 100% (28 Jul 2024 18:37) (97% - 100%)    Parameters below as of 28 Jul 2024 18:37  Patient On (Oxygen Delivery Method): room air      CONSTITUTIONAL: NAD,   ENMT: Moist oral mucosa, no pharyngeal injection or exudates;   RESPIRATORY: Normal respiratory effort; lungs are clear to auscultation bilaterally  CARDIOVASCULAR: Regular rate and rhythm, normal S1 and S2, ; No lower extremity edema;  ABDOMEN: Nontender to palpation, normoactive bowel sounds, no rebound/guarding; ileostomy bag , no blood , Lind, not draining   PSYCH: A+O to person, place, and time; affect appropriate  NEUROLOGY: CN 2-12 are intact and symmetric; no gross sensory deficits   SKIN: No rashes;     LABS:                        6.9    6.68  )-----------( 216      ( 28 Jul 2024 07:54 )             22.8     07-28    131<L>  |  97<L>  |  18  ----------------------------<  101<H>  3.9   |  25  |  1.85<H>    Ca    8.0<L>      28 Jul 2024 07:02  Phos  3.0     07-26  Mg     1.40     07-26    TPro  5.2<L>  /  Alb  2.4<L>  /  TBili  0.6  /  DBili  x   /  AST  12  /  ALT  7   /  AlkPhos  78  07-28    PTT - ( 28 Jul 2024 00:56 )  PTT:58.4 sec      Urinalysis Basic - ( 28 Jul 2024 07:02 )    Color: x / Appearance: x / SG: x / pH: x  Gluc: 101 mg/dL / Ketone: x  / Bili: x / Urobili: x   Blood: x / Protein: x / Nitrite: x   Leuk Esterase: x / RBC: x / WBC x   Sq Epi: x / Non Sq Epi: x / Bacteria: x            RADIOLOGY & ADDITIONAL TESTS:  Imaging from Last 24 Hours:    Electrocardiogram/QTc Interval:    COORDINATION OF CARE:  Care Discussed with Consultants/Other Providers: ACP    Patient is a 58y old  Male who presents with a chief complaint of Hematuria, fatigue (31 Jul 2024 15:33)      SUBJECTIVE / OVERNIGHT EVENTS: No acute events overnight. Pt has no new complaints. He passed TOV    ADDITIONAL REVIEW OF SYSTEMS:    MEDICATIONS  (STANDING):  pantoprazole    Tablet 40 milliGRAM(s) Oral two times a day  sodium bicarbonate 650 milliGRAM(s) Oral two times a day  sodium chloride 1 Gram(s) Oral three times a day  sodium chloride 1 Gram(s) Oral three times a day  tamsulosin 0.4 milliGRAM(s) Oral at bedtime    MEDICATIONS  (PRN):  loperamide 2 milliGRAM(s) Oral every 6 hours PRN high ileostomy output      CAPILLARY BLOOD GLUCOSE        I&O's Summary    31 Jul 2024 07:01  -  01 Aug 2024 07:00  --------------------------------------------------------  IN: 400 mL / OUT: 704 mL / NET: -304 mL    01 Aug 2024 07:01  -  01 Aug 2024 15:46  --------------------------------------------------------  IN: 0 mL / OUT: 700 mL / NET: -700 mL        PHYSICAL EXAM:  Vital Signs Last 24 Hrs  T(C): 36.8 (01 Aug 2024 06:00), Max: 36.8 (31 Jul 2024 17:55)  T(F): 98.2 (01 Aug 2024 06:00), Max: 98.2 (31 Jul 2024 17:55)  HR: 72 (01 Aug 2024 10:08) (72 - 89)  BP: 114/67 (01 Aug 2024 10:08) (102/67 - 114/67)  BP(mean): --  RR: 18 (01 Aug 2024 10:08) (17 - 19)  SpO2: 100% (01 Aug 2024 10:08) (99% - 100%)    Parameters below as of 01 Aug 2024 10:08  Patient On (Oxygen Delivery Method): room air      CONSTITUTIONAL: NAD  EYES: PERRLA; conjunctiva and sclera clear  ENMT: Moist oral mucosa, no pharyngeal injection or exudates; normal dentition  NECK: Supple, no palpable masses; no thyromegaly  RESPIRATORY: Normal respiratory effort; lungs are clear to auscultation bilaterally  CARDIOVASCULAR: Regular rate and rhythm, normal S1 and S2, no murmur/rub/gallop; No lower extremity edema; Peripheral pulses are 2+ bilaterally  ABDOMEN: Nontender to palpation, normoactive bowel sounds, no rebound/guarding; No hepatosplenomegaly  MUSCULOSKELETAL:  Normal gait; no clubbing or cyanosis of digits; no joint swelling or tenderness to palpation  PSYCH: A+O to person, place, and time; affect appropriate  NEUROLOGY: CN 2-12 are intact and symmetric; no gross sensory deficits   SKIN: No rashes; no palpable lesions    LABS:                        8.7    8.16  )-----------( 157      ( 01 Aug 2024 06:40 )             27.3     08-01    129<L>  |  99  |  13  ----------------------------<  106<H>  4.3   |  22  |  1.42<H>    Ca    8.2<L>      01 Aug 2024 06:40  Phos  2.9     08-01  Mg     1.70     08-01    TPro  5.7<L>  /  Alb  2.5<L>  /  TBili  0.6  /  DBili  x   /  AST  15  /  ALT  9   /  AlkPhos  97  08-01          Urinalysis Basic - ( 01 Aug 2024 06:40 )    Color: x / Appearance: x / SG: x / pH: x  Gluc: 106 mg/dL / Ketone: x  / Bili: x / Urobili: x   Blood: x / Protein: x / Nitrite: x   Leuk Esterase: x / RBC: x / WBC x   Sq Epi: x / Non Sq Epi: x / Bacteria: x          RADIOLOGY & ADDITIONAL TESTS:  Results Reviewed:   Imaging Personally Reviewed:  Electrocardiogram Personally Reviewed:    COORDINATION OF CARE:  Care Discussed with Consultants/Other Providers [Y/N]:  Prior or Outpatient Records Reviewed [Y/N]:   Universal Safety Interventions
